# Patient Record
Sex: MALE | Race: NATIVE HAWAIIAN OR OTHER PACIFIC ISLANDER
[De-identification: names, ages, dates, MRNs, and addresses within clinical notes are randomized per-mention and may not be internally consistent; named-entity substitution may affect disease eponyms.]

---

## 2018-06-19 ENCOUNTER — HOSPITAL ENCOUNTER (OUTPATIENT)
Dept: HOSPITAL 47 - EC | Age: 68
Setting detail: OBSERVATION
LOS: 1 days | Discharge: HOME | End: 2018-06-20
Attending: INTERNAL MEDICINE | Admitting: INTERNAL MEDICINE
Payer: MEDICARE

## 2018-06-19 DIAGNOSIS — R07.89: Primary | ICD-10-CM

## 2018-06-19 DIAGNOSIS — Z79.899: ICD-10-CM

## 2018-06-19 DIAGNOSIS — R20.0: ICD-10-CM

## 2018-06-19 DIAGNOSIS — I71.2: ICD-10-CM

## 2018-06-19 DIAGNOSIS — I10: ICD-10-CM

## 2018-06-19 DIAGNOSIS — R20.2: ICD-10-CM

## 2018-06-19 DIAGNOSIS — E78.5: ICD-10-CM

## 2018-06-19 DIAGNOSIS — R79.89: ICD-10-CM

## 2018-06-19 DIAGNOSIS — Z82.5: ICD-10-CM

## 2018-06-19 DIAGNOSIS — Z82.49: ICD-10-CM

## 2018-06-19 DIAGNOSIS — Z87.891: ICD-10-CM

## 2018-06-19 LAB
ALBUMIN SERPL-MCNC: 4.8 G/DL (ref 3.5–5)
ALP SERPL-CCNC: 61 U/L (ref 38–126)
ALT SERPL-CCNC: 31 U/L (ref 21–72)
ANION GAP SERPL CALC-SCNC: 12 MMOL/L
APTT BLD: 22.9 SEC (ref 22–30)
AST SERPL-CCNC: 27 U/L (ref 17–59)
BASOPHILS # BLD AUTO: 0.1 K/UL (ref 0–0.2)
BASOPHILS NFR BLD AUTO: 1 %
BUN SERPL-SCNC: 16 MG/DL (ref 9–20)
CALCIUM SPEC-MCNC: 9.9 MG/DL (ref 8.4–10.2)
CHLORIDE SERPL-SCNC: 106 MMOL/L (ref 98–107)
CK SERPL-CCNC: 101 U/L (ref 55–170)
CK SERPL-CCNC: 91 U/L (ref 55–170)
CO2 SERPL-SCNC: 24 MMOL/L (ref 22–30)
D DIMER PPP FEU-MCNC: 0.63 MG/L FEU (ref ?–0.6)
EOSINOPHIL # BLD AUTO: 0.1 K/UL (ref 0–0.7)
EOSINOPHIL NFR BLD AUTO: 2 %
ERYTHROCYTE [DISTWIDTH] IN BLOOD BY AUTOMATED COUNT: 4.85 M/UL (ref 4.3–5.9)
ERYTHROCYTE [DISTWIDTH] IN BLOOD: 12.3 % (ref 11.5–15.5)
GLUCOSE SERPL-MCNC: 108 MG/DL (ref 74–99)
HCT VFR BLD AUTO: 44.9 % (ref 39–53)
HGB BLD-MCNC: 14.8 GM/DL (ref 13–17.5)
INR PPP: 1 (ref ?–1.2)
LYMPHOCYTES # SPEC AUTO: 1.4 K/UL (ref 1–4.8)
LYMPHOCYTES NFR SPEC AUTO: 18 %
MAGNESIUM SPEC-SCNC: 2 MG/DL (ref 1.6–2.3)
MCH RBC QN AUTO: 30.5 PG (ref 25–35)
MCHC RBC AUTO-ENTMCNC: 32.9 G/DL (ref 31–37)
MCV RBC AUTO: 92.7 FL (ref 80–100)
MONOCYTES # BLD AUTO: 0.4 K/UL (ref 0–1)
MONOCYTES NFR BLD AUTO: 5 %
NEUTROPHILS # BLD AUTO: 5.6 K/UL (ref 1.3–7.7)
NEUTROPHILS NFR BLD AUTO: 73 %
PLATELET # BLD AUTO: 251 K/UL (ref 150–450)
POTASSIUM SERPL-SCNC: 4.4 MMOL/L (ref 3.5–5.1)
PROT SERPL-MCNC: 8.1 G/DL (ref 6.3–8.2)
PT BLD: 10.1 SEC (ref 9–12)
SODIUM SERPL-SCNC: 142 MMOL/L (ref 137–145)
TROPONIN I SERPL-MCNC: <0.012 NG/ML (ref 0–0.03)
TROPONIN I SERPL-MCNC: <0.012 NG/ML (ref 0–0.03)
WBC # BLD AUTO: 7.7 K/UL (ref 3.8–10.6)

## 2018-06-19 PROCEDURE — 82553 CREATINE MB FRACTION: CPT

## 2018-06-19 PROCEDURE — 93351 STRESS TTE COMPLETE: CPT

## 2018-06-19 PROCEDURE — 83735 ASSAY OF MAGNESIUM: CPT

## 2018-06-19 PROCEDURE — 36415 COLL VENOUS BLD VENIPUNCTURE: CPT

## 2018-06-19 PROCEDURE — 85610 PROTHROMBIN TIME: CPT

## 2018-06-19 PROCEDURE — 84484 ASSAY OF TROPONIN QUANT: CPT

## 2018-06-19 PROCEDURE — 93306 TTE W/DOPPLER COMPLETE: CPT

## 2018-06-19 PROCEDURE — 80053 COMPREHEN METABOLIC PANEL: CPT

## 2018-06-19 PROCEDURE — 80061 LIPID PANEL: CPT

## 2018-06-19 PROCEDURE — 85379 FIBRIN DEGRADATION QUANT: CPT

## 2018-06-19 PROCEDURE — 82550 ASSAY OF CK (CPK): CPT

## 2018-06-19 PROCEDURE — 71275 CT ANGIOGRAPHY CHEST: CPT

## 2018-06-19 PROCEDURE — 99285 EMERGENCY DEPT VISIT HI MDM: CPT

## 2018-06-19 PROCEDURE — 71046 X-RAY EXAM CHEST 2 VIEWS: CPT

## 2018-06-19 PROCEDURE — 85025 COMPLETE CBC W/AUTO DIFF WBC: CPT

## 2018-06-19 PROCEDURE — 93005 ELECTROCARDIOGRAM TRACING: CPT

## 2018-06-19 PROCEDURE — 85730 THROMBOPLASTIN TIME PARTIAL: CPT

## 2018-06-19 RX ADMIN — NITROGLYCERIN SCH: 20 OINTMENT TOPICAL at 17:58

## 2018-06-19 RX ADMIN — LOSARTAN POTASSIUM SCH MG: 50 TABLET, FILM COATED ORAL at 20:34

## 2018-06-19 RX ADMIN — METOPROLOL SUCCINATE SCH MG: 25 TABLET, EXTENDED RELEASE ORAL at 18:09

## 2018-06-19 NOTE — P.HPIM
History of Present Illness


67-year-old gentleman with a came in is on and off chest pain about 2-3/10 in 

severity has been going on for sometime last for about few minutes with 

radiation to the left arm as tingling numbness and possible radiation to the 

jaw as well as no associated diaphoresis no associated nausea or shortness of 

breath patient denied any pleuritic chest pain.  CT angios of the chest did not 

show any significant abnormality.  Patient does have history of hypertension 

family history of coronary artery disease denied any premature coronary artery 

disease family history denied any cough runny nose patient is from [place] 

is in his Route to [place].  Patient's pain is on the left side of the chest.  

No other aggravating or relieving factors not associated with food.  EKG did 

not show any acute ST-T wave changes troponin so far negative.








Review of Systems


REVIEW OF SYSTEMS: 


CONSTITUTIONAL: No fever, no malaise, no fatigue. 


HEENT: No recent visual problems or hearing problems. Denied any sore throat. 


CARDIOVASCULAR: No  orthopnea, PND, no palpitations, no syncope. 


PULMONARY: No shortness of breath, no cough, no hemoptysis. 


GASTROINTESTINAL: No diarrhea, no nausea, no vomiting, no abdominal pain. 

Normoactive bowel sounds. 


NEUROLOGICAL: No headaches, no weakness, no numbness. 


HEMATOLOGICAL: Denies any bleeding or petechiae. 


GENITOURINARY: Denies any burning micturition, frequency, or urgency. 


MUSCULOSKELETAL/RHEUMATOLOGICAL: Denies any joint pain, swelling, or any muscle 

pain. 


ENDOCRINE: Denies any polyuria or polydipsia. 





The rest of the 14-point review of systems is negative.











Past Medical History


Past Medical History: Hypertension


History of Any Multi-Drug Resistant Organisms: None Reported


Additional Past Surgical History / Comment(s): rt thumb


Past Psychological History: No Psychological Hx Reported, Depression


Smoking Status: Never smoker


Past Alcohol Use History: Occasional


Past Drug Use History: Marijuana





Medications and Allergies


 Home Medications











 Medication  Instructions  Recorded  Confirmed  Type


 


Ibuprofen [Advil] 200 mg PO Q8HR PRN 06/19/18 06/19/18 History


 


Losartan [Cozaar] 50 mg PO BID 06/19/18 06/19/18 History


 


Metoprolol Succinate (ER) [Toprol 25 mg PO TID 06/19/18 06/19/18 History





Xl]    











 Allergies











Allergy/AdvReac Type Severity Reaction Status Date / Time


 


No Known Allergies Allergy   Verified 06/19/18 12:28














Physical Exam


Vitals: 


 Vital Signs











  Temp Pulse Resp BP Pulse Ox


 


 06/19/18 13:00   72  18  151/98  100


 


 06/19/18 12:33   70  18  147/94  100


 


 06/19/18 11:30  98.9 F  80  18  194/110  98








 Intake and Output











 06/18/18 06/19/18 06/19/18





 22:59 06:59 14:59


 


Other:   


 


  Weight   69.672 kg











PHYSICAL EXAMINATION: 





GENERAL: The patient is alert and oriented x3, not in any acute distress. Well 

developed, well nourished. 


HEENT: Pupils are round and equally reacting to light. EOMI. No scleral 

icterus. No conjunctival pallor. Normocephalic, atraumatic. No pharyngeal 

erythema. No thyromegaly. 


CARDIOVASCULAR: S1 and S2 present. No murmurs, rubs, or gallops. 


PULMONARY: Chest is clear to auscultation, no wheezing or crackles. 


ABDOMEN: Soft, nontender, nondistended, normoactive bowel sounds. No palpable 

organomegaly. 


MUSCULOSKELETAL: No joint swelling or deformity.


EXTREMITIES: No cyanosis, clubbing, or pedal edema. 


NEUROLOGICAL: Gross neurological examination did not reveal any focal deficits. 


SKIN: No rashes. 











Results


CBC & Chem 7: 


 06/19/18 11:44





 06/19/18 11:44


Labs: 


 Abnormal Lab Results - Last 24 Hours (Table)











  06/19/18 06/19/18 Range/Units





  11:44 11:44 


 


D-Dimer   0.63 H  (<0.60)  mg/L FEU


 


Glucose  108 H   (74-99)  mg/dL














Assessment and Plan


Plan: 


Chest pain: Etiology is not clear, we will rule out acute coronary syndromes 2 

more sets of troponins and EKGs patient may need a stress test cardiology will 

evaluate the patient.


-Rule out pulmonary embolism


Hypertension continue his home medications of losartan and metoprolol

## 2018-06-19 NOTE — XR
EXAMINATION TYPE: XR chest 2V

 

DATE OF EXAM: 6/19/2018

 

COMPARISON: NONE

 

TECHNIQUE: PA and lateral views submitted.

 

HISTORY: Chest pain

 

FINDINGS:

The lungs are clear and  there is no pneumothorax, pleural effusion, or focal pneumonia.  Atheroscler
otic change aorta. No overt failure. Chronic appearing rib deformity along the lower right lateral ri
b cage suggest remote trauma. Degenerative change of the spine.

 

IMPRESSION: 

1. No acute process.

## 2018-06-19 NOTE — ED
General Adult HPI





- General


Chief complaint: Chest Pain


Stated complaint: Chest Pain


Time Seen by Provider: 06/19/18 11:36


Source: patient, RN notes reviewed


Mode of arrival: wheelchair


Limitations: no limitations





- History of Present Illness


Initial comments: 





Patient is a pleasant 67-year-old male presenting to the emergency Department 

with complaints of chest discomfort.  Symptoms have been present for the past 3 

days.  Symptoms are intermittent and mild.  Discomfort is described as pressure 

towards the right side of the chest.  There is some tingling of the left arm 

associated.  Otherwise no dyspnea, nausea, or diaphoresis.  Patient did have 

similar symptoms once years ago that he associated to his high blood pressure.





- Related Data


 Home Medications











 Medication  Instructions  Recorded  Confirmed


 


Ibuprofen [Advil] 200 mg PO Q8HR PRN 06/19/18 06/19/18


 


Losartan [Cozaar] 50 mg PO BID 06/19/18 06/19/18


 


Metoprolol Succinate (ER) [Toprol 25 mg PO TID 06/19/18 06/19/18





Xl]   











 Allergies











Allergy/AdvReac Type Severity Reaction Status Date / Time


 


No Known Allergies Allergy   Verified 06/19/18 12:28














Review of Systems


ROS Statement: 


Those systems with pertinent positive or pertinent negative responses have been 

documented in the HPI.





ROS Other: All systems not noted in ROS Statement are negative.


Constitutional: Denies: fever


Eyes: Denies: eye pain


ENT: Denies: ear pain


Respiratory: Denies: cough, dyspnea


Cardiovascular: Reports: chest pain


Endocrine: Denies: fatigue


Gastrointestinal: Denies: abdominal pain


Genitourinary: Denies: dysuria


Musculoskeletal: Denies: back pain


Skin: Denies: rash


Neurological: Denies: weakness





Past Medical History


Past Medical History: Hypertension


History of Any Multi-Drug Resistant Organisms: None Reported


Additional Past Surgical History / Comment(s): rt thumb


Past Psychological History: No Psychological Hx Reported, Depression


Smoking Status: Never smoker


Past Alcohol Use History: Occasional


Past Drug Use History: Marijuana





General Exam


Limitations: no limitations


General appearance: alert, in no apparent distress


Head exam: Present: atraumatic


Eye exam: Present: normal appearance, PERRL


ENT exam: Present: normal oropharynx


Neck exam: Present: normal inspection


Respiratory exam: Present: normal lung sounds bilaterally.  Absent: chest wall 

tenderness


Cardiovascular Exam: Present: regular rate, normal rhythm, normal heart sounds


  ** Expanded


Peripheral pulses: 2+: Radial (R), Radial (L), Dorsalis Pedis (R), Dorsalis 

Pedis (L)


GI/Abdominal exam: Present: soft.  Absent: tenderness


Extremities exam: Present: normal inspection.  Absent: pedal edema, calf 

tenderness


Neurological exam: Present: alert


Psychiatric exam: Present: normal affect, normal mood


Skin exam: Present: normal color





Course


 Vital Signs











  06/19/18 06/19/18 06/19/18





  11:30 12:33 13:00


 


Temperature 98.9 F  


 


Pulse Rate 80 70 72


 


Respiratory 18 18 18





Rate   


 


Blood Pressure 194/110 147/94 151/98


 


O2 Sat by Pulse 98 100 100





Oximetry   














EKG Findings





- EKG Comments:


EKG Findings:: Normal sinus rhythm 66.  .  QRS 92.  .  .  

Left axis.  Normal QRS.  No acute ST change.





Medical Decision Making





- Medical Decision Making





Patient reevaluated and resting comfortably in bed.  Patient and family updated 

on results, specifically regarding aortic aneurysm and need for further 

evaluation and follow-up for this.  Case was discussed with Dr. Mae, who 

will admit for hospital call.





- Lab Data


Result diagrams: 


 06/19/18 11:44





 06/19/18 11:44


 Lab Results











  06/19/18 06/19/18 06/19/18 Range/Units





  11:44 11:44 11:44 


 


WBC   7.7   (3.8-10.6)  k/uL


 


RBC   4.85   (4.30-5.90)  m/uL


 


Hgb   14.8   (13.0-17.5)  gm/dL


 


Hct   44.9   (39.0-53.0)  %


 


MCV   92.7   (80.0-100.0)  fL


 


MCH   30.5   (25.0-35.0)  pg


 


MCHC   32.9   (31.0-37.0)  g/dL


 


RDW   12.3   (11.5-15.5)  %


 


Plt Count   251   (150-450)  k/uL


 


Neutrophils %   73   %


 


Lymphocytes %   18   %


 


Monocytes %   5   %


 


Eosinophils %   2   %


 


Basophils %   1   %


 


Neutrophils #   5.6   (1.3-7.7)  k/uL


 


Lymphocytes #   1.4   (1.0-4.8)  k/uL


 


Monocytes #   0.4   (0-1.0)  k/uL


 


Eosinophils #   0.1   (0-0.7)  k/uL


 


Basophils #   0.1   (0-0.2)  k/uL


 


PT     (9.0-12.0)  sec


 


INR     (<1.2)  


 


APTT     (22.0-30.0)  sec


 


D-Dimer     (<0.60)  mg/L FEU


 


Sodium    142  (137-145)  mmol/L


 


Potassium    4.4  (3.5-5.1)  mmol/L


 


Chloride    106  ()  mmol/L


 


Carbon Dioxide    24  (22-30)  mmol/L


 


Anion Gap    12  mmol/L


 


BUN    16  (9-20)  mg/dL


 


Creatinine    1.13  (0.66-1.25)  mg/dL


 


Est GFR (CKD-EPI)AfAm    78  (>60 ml/min/1.73 sqM)  


 


Est GFR (CKD-EPI)NonAf    67  (>60 ml/min/1.73 sqM)  


 


Glucose    108 H  (74-99)  mg/dL


 


Calcium    9.9  (8.4-10.2)  mg/dL


 


Magnesium    2.0  (1.6-2.3)  mg/dL


 


Total Bilirubin    0.8  (0.2-1.3)  mg/dL


 


AST    27  (17-59)  U/L


 


ALT    31  (21-72)  U/L


 


Alkaline Phosphatase    61  ()  U/L


 


Total Creatine Kinase  101    ()  U/L


 


CK-MB (CK-2)  0.7    (0.0-2.4)  ng/mL


 


CK-MB (CK-2) Rel Index  0.7    


 


Troponin I  <0.012    (0.000-0.034)  ng/mL


 


Total Protein    8.1  (6.3-8.2)  g/dL


 


Albumin    4.8  (3.5-5.0)  g/dL














  06/19/18 Range/Units





  11:44 


 


WBC   (3.8-10.6)  k/uL


 


RBC   (4.30-5.90)  m/uL


 


Hgb   (13.0-17.5)  gm/dL


 


Hct   (39.0-53.0)  %


 


MCV   (80.0-100.0)  fL


 


MCH   (25.0-35.0)  pg


 


MCHC   (31.0-37.0)  g/dL


 


RDW   (11.5-15.5)  %


 


Plt Count   (150-450)  k/uL


 


Neutrophils %   %


 


Lymphocytes %   %


 


Monocytes %   %


 


Eosinophils %   %


 


Basophils %   %


 


Neutrophils #   (1.3-7.7)  k/uL


 


Lymphocytes #   (1.0-4.8)  k/uL


 


Monocytes #   (0-1.0)  k/uL


 


Eosinophils #   (0-0.7)  k/uL


 


Basophils #   (0-0.2)  k/uL


 


PT  10.1  (9.0-12.0)  sec


 


INR  1.0  (<1.2)  


 


APTT  22.9  (22.0-30.0)  sec


 


D-Dimer  0.63 H  (<0.60)  mg/L FEU


 


Sodium   (137-145)  mmol/L


 


Potassium   (3.5-5.1)  mmol/L


 


Chloride   ()  mmol/L


 


Carbon Dioxide   (22-30)  mmol/L


 


Anion Gap   mmol/L


 


BUN   (9-20)  mg/dL


 


Creatinine   (0.66-1.25)  mg/dL


 


Est GFR (CKD-EPI)AfAm   (>60 ml/min/1.73 sqM)  


 


Est GFR (CKD-EPI)NonAf   (>60 ml/min/1.73 sqM)  


 


Glucose   (74-99)  mg/dL


 


Calcium   (8.4-10.2)  mg/dL


 


Magnesium   (1.6-2.3)  mg/dL


 


Total Bilirubin   (0.2-1.3)  mg/dL


 


AST   (17-59)  U/L


 


ALT   (21-72)  U/L


 


Alkaline Phosphatase   ()  U/L


 


Total Creatine Kinase   ()  U/L


 


CK-MB (CK-2)   (0.0-2.4)  ng/mL


 


CK-MB (CK-2) Rel Index   


 


Troponin I   (0.000-0.034)  ng/mL


 


Total Protein   (6.3-8.2)  g/dL


 


Albumin   (3.5-5.0)  g/dL














- Radiology Data


Radiology results: report reviewed (CT angios chest shows no evidence of 

pulmonary embolism.  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 

image reviewed (Chest x-ray shows no acute process)





Disposition


Clinical Impression: 


 Chest pain





Disposition: ADMITTED AS IP TO THIS [facility]


Is patient prescribed a controlled substance at d/c from ED?: No


Referrals: 


None,Stated [REFERRING] - 1-2 days


Decision Time: 14:11

## 2018-06-20 VITALS — SYSTOLIC BLOOD PRESSURE: 166 MMHG | HEART RATE: 88 BPM | TEMPERATURE: 98.3 F | DIASTOLIC BLOOD PRESSURE: 97 MMHG

## 2018-06-20 VITALS — RESPIRATION RATE: 18 BRPM

## 2018-06-20 LAB
CHOLEST SERPL-MCNC: 202 MG/DL (ref ?–200)
CK SERPL-CCNC: 79 U/L (ref 55–170)
HDLC SERPL-MCNC: 36 MG/DL (ref 40–60)
LDLC SERPL CALC-MCNC: 117 MG/DL (ref 0–99)
TRIGL SERPL-MCNC: 243 MG/DL (ref ?–150)
TROPONIN I SERPL-MCNC: <0.012 NG/ML (ref 0–0.03)

## 2018-06-20 RX ADMIN — LOSARTAN POTASSIUM SCH MG: 50 TABLET, FILM COATED ORAL at 12:14

## 2018-06-20 RX ADMIN — NITROGLYCERIN SCH: 20 OINTMENT TOPICAL at 05:00

## 2018-06-20 RX ADMIN — NITROGLYCERIN SCH: 20 OINTMENT TOPICAL at 11:39

## 2018-06-20 RX ADMIN — METOPROLOL SUCCINATE SCH: 25 TABLET, EXTENDED RELEASE ORAL at 05:00

## 2018-06-20 NOTE — P.STRESS
- Stress Test Note


Stress Test Results/Findings: 


Exam Performed:  stress echo exercise


Exam Date: 06/20/18


Reason for Exam: CHEST PAIN





Height: 5 ft 4 in


Weight: 69.4 kg


Protocol: NICHOLAS


Stage: 3


Duration of Exercise: 8:00





Resting Heart Rate: 93


Resting Blood Pressure: 170/91


Maximum Achieved Heart Rate: 146


Maximum Achieved Blood Pressure: 203/101


85% PMHR: 130


100% PMHR: 153


METS: 9.7


Technologist Comment: 





Stress Test Results/Findings: 


This is a 67-year-old gentleman with history of hypertension, 

hypercholesterolemia and family history being evaluated for chest pains.





Stress data: Baseline EKG showed sinus rhythm with normal TN interval and QRS 

duration.  Blood pressure at rest is 170/90 with pulse rate of 93.  Patient 

walked on the Nicholas protocol for 8 minutes achieving a maximal heart rate of 

146 with a blood pressure 165/88.  EKGs taken during and after exercise did not 

reveal any changes to suggest ischemia.





Echo data: Baseline echo images show normal wall motion and thickening.  

Exercise echo images showed augmentation of wall motion and thickening in all 

the segments.





Final impression: #1.  Negative stress test #2.  Average exercise capacity #3.  

Negative stress echo.  #4.  Patient did not experience any chest pain.

## 2018-06-20 NOTE — ECHOF
Referral Reason:cp



MEASUREMENTS

--------

HEIGHT: 162.6 cm

WEIGHT: 69.4 kg

BP: 124/81

RVIDd:   2.6 cm     (< 3.3)

IVSd:   1.2 cm     (0.6 - 1.1)

LVIDd:   4.3 cm     (3.9 - 5.3)

LVPWd:   0.9 cm     (0.6 - 1.1)

IVSs:   1.6 cm

LVIDs:   2.6 cm

LVPWs:   1.7 cm

LA Diam:   3.3 cm     (2.7 - 3.8)

LAESV Index (A-L):   34.14 ml/m

Ao Diam:   3.6 cm     (2.0 - 3.7)

AV Cusp:   1.9 cm     (1.5 - 2.6)

LA Diam:   3.7 cm     (2.7 - 3.8)

MV EXCURSION:   14.230 mm     (> 18.000)

MV EF SLOPE:   27 mm/s     (70 - 150)

EPSS:   0.4 cm

MV E Ab:   0.46 m/s

MV DecT:   190 ms

MV A Ab:   0.74 m/s

MV E/A Ratio:   0.62 

AR PHT:   616 ms

RAP:   5.00 mmHg

RVSP:   26.66 mmHg







FINDINGS

--------

Sinus rhythm.

This was a technically good study.

The left ventricular size is normal.   There is mild concentric left ventricular hypertrophy.   Overa
ll left ventricular systolic function is low-normal with, an EF between 50 - 55 %.

The right ventricle is normal in size.

The left atrial size is normal.    Normal LA  size by volume 22+/-6 ml/m2.

The right atrial size is normal.

There is mild aortic regurgitation.

Mild mitral annular calcification present.   Mild mitral regurgitation is present.

Mild tricuspid regurgitation present.   There is no evidence of pulmonary hypertension.   The right v
entricular systolic pressure, as measured by Doppler, is 26.66mmHg.

There is no pulmonic regurgitation present.

The aortic root size is normal.   Ascending Aortic is dilated and measures 4.0cm.

There is no pericardial effusion.



CONCLUSIONS

--------

1. The left ventricular size is normal.

2. There is mild concentric left ventricular hypertrophy.

3. Overall left ventricular systolic function is low-normal with, an EF between 50 - 55 %.

4. The right ventricle is normal in size.

5. The left atrial size is normal.

6. There is mild aortic regurgitation.

7. Mild mitral annular calcification present.

8. Mild mitral regurgitation is present.

9. Mild tricuspid regurgitation present.

10. There is no evidence of pulmonary hypertension.

11. The right ventricular systolic pressure, as measured by Doppler, is 26.66mmHg.

12. There is no pulmonic regurgitation present.

13. The aortic root size is normal.

14. Ascending Aortic is dilated and measures 4.0cm.

15. There is no pericardial effusion.





SONOGRAPHER: Milena Rooney RDCS

## 2018-06-20 NOTE — P.CRDCN
History of Present Illness


History of present illness: 





Mr. Celaya is a pleasant 67-year-old male past medical history significant 

for hypertension. He denies history of coronary artery disease, diabetes 

mellitus or aortic abnormality. He states he has been told in the past he has 

dyslipidemia but is not on medications. He is here on vacation from [place]. 

While he was driving he felt a a heavy pressure sensation in the right anterior 

chest wall with some left hand tingling. The symptoms have been intermittent 

for the previous 3 days with no specific aggravating or alleviating factors. He 

denies associated shortness of breath, palpitations, nausea, vomiting or 

diaphoresis. No radiation to the back, neck or jaw. The pain when it comes has 

remained localized to the right anterior chest wall, non-reproducible on 

palpitation. CTA of the chest was performed secondary to elevated d-dimer and 

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  He denies 

ever being told this in the past.  He has had no further symptoms of chest 

discomfort since admission to the hospital.  Telemetry tracings have been 

unremarkable.  Documented medication is Toprol 25 mg 3 times a day.  Discussion 

was had with the patient regarding his dosage of this and he states he does 

take it 3 times a day although he is out of his prescription he has no bottles 

for confirmation.  I suspect this is Toprol tartrate not succinate.  Blood 

pressure on arrival was 194/110 with a heart rate of 80.





EKG reveals sinus mechanism with no acute ST or T-wave abnormalities. 


Chest xray is negative for an acute cardiopulmonary process. 


Laboratory data reviewed, hemoglobin 14.8, platelets 251, d-dimer 0.63, sodium 

142, potassium 4.4, creatinine 1.13, magnesium 2.0, cardiac enzymes negative 3

, , HDL 36, triglycerides 243 interval cholesterol 202.


Current cardiac medications include Lopressor 25 mg 3 times a day and losartan 

50 mg twice a day.





Review of Systems





At the time of my exam:


CONSTITUTIONAL: Denies fever. Denies chills.


EYES: Denies blurred vision. Denies vision changes. Denies eye pain.


EARS, NOSE, MOUTH & THROAT: Denies headache. Denies sore throat. Denies ear 

pain.


CARDIOVASCULAR: Denies chest pain. Denies shortness of breath. Denies 

orthopnea. Denies PND. Denies palpitations.


RESPIRATORY: Denies cough. 


GASTROINTESTINAL: Denies abdominal pain. Denies diarrhea. Denies constipation. 

Denies nausea. Denies vomiting.


MUSCULOSKELETAL: Denies myalgias.


INTEGUMENTARY: Denies pruitis. Denies rash.


NEUROLOGIC: Denies numbness. Denies tingling. Denies weakness.


PSYCHIATRIC: Denies anxiety. Denies depression.


ENDOCRINE: Denies fatigue. Denies weight change. Denies polydipsia. Denies 

polyurina.


GENITOURINARY: Denies burning, hematuria or urgency with micturation.


HEMATOLOGIC: Denies history of anemia. Denies bleeding. 








Past Medical History


Past Medical History: Hypertension


Additional Past Medical History / Comment(s): upper bridge


History of Any Multi-Drug Resistant Organisms: None Reported


Additional Past Surgical History / Comment(s): rt thumb recontruction sx has 

pins


Past Anesthesia/Blood Transfusion Reactions: No Reported Reaction


Additional Past Anesthesia/Blood Transfusion Reaction / Comment(s): 

clausterphobia


Smoking Status: Former smoker





- Past Family History


  ** Father


Family Medical History: COPD


Additional Family Medical History / Comment(s):  at age 82





  ** Mother


Family Medical History: Hypertension


Additional Family Medical History / Comment(s): alive-is age 89





Medications and Allergies


 Home Medications











 Medication  Instructions  Recorded  Confirmed  Type


 


Ibuprofen [Advil] 200 mg PO Q8HR PRN 18 History


 


Losartan [Cozaar] 50 mg PO BID 18 History


 


Metoprolol Succinate (ER) [Toprol 25 mg PO TID 18 History





Xl]    











 Allergies











Allergy/AdvReac Type Severity Reaction Status Date / Time


 


No Known Allergies Allergy   Verified 18 12:28














Physical Exam


Vitals: 


 Vital Signs











  Temp Pulse Pulse Resp BP BP Pulse Ox


 


 18 08:00    79  18   


 


 18 07:50  97.8 F   79  18   124/81  99


 


 18 04:00  97.6 F   59 L  16   151/85  100


 


 18 00:00  98.6 F   79  16   130/85  97


 


 18 20:00     16   


 


 18 18:36  98.7 F   99  16   137/86  96


 


 18 17:44  97.9 F  99   16  115/78   97


 


 18 14:46   82   18  134/88   99


 


 18 13:00   72   18  151/98   100


 


 18 12:33   70   18  147/94   100


 


 18 11:30  98.9 F  80   18  194/110   98








 Intake and Output











 18





 22:59 06:59 14:59


 


Intake Total 200  


 


Balance 200  


 


Intake:   


 


  Oral 200  


 


Other:   


 


  Voiding Method Toilet Toilet Toilet


 


  # Voids  1 














Blood pressure 124/81 heart rate 79 afebrile maintaining oxygen saturation on 

room air.


GENERAL: This is a 67-year-old male in no apparent distress at the time of my 

examination.


HEENT: Head is atraumatic, normocephalic. Pupils are equal, round. Sclerae 

anicteric. Conjunctivae are clear. Mucous membranes of the mouth are moist. 

Neck is supple. There is no jugular venous distention. No carotid bruit is 

heard.


LUNGS: Clear to auscultation no wheezes, rales or rhonchi. No chest wall 

tenderness is noted on palpation or with deep breathing.


HEART: Regular rate and rhythm without murmurs, rubs or gallops. S1 and S2 

heard.


ABDOMEN: Soft, nontender. Bowel sounds are heard. No organomegaly noted.


EXTREMITIES: No evidence of peripheral edema and no calf tenderness noted.


VASCULAR: Radial and dorsalis pedis pulses palpated, no evidence of clubbing.  


NEUROLOGIC: Patient is awake, alert and oriented x3.


 








Results





 18 11:44





 18 11:44


 Cardiac Enzymes











  18 Range/Units





  11:44 11:44 18:23 


 


AST   27   (17-59)  U/L


 


CK-MB (CK-2)  0.7   0.6  (0.0-2.4)  ng/mL


 


Troponin I  <0.012   <0.012  (0.000-0.034)  ng/mL














  18 Range/Units





  23:11 


 


AST   (17-59)  U/L


 


CK-MB (CK-2)  0.5  (0.0-2.4)  ng/mL


 


Troponin I  <0.012  (0.000-0.034)  ng/mL








 Coagulation











  18 Range/Units





  11:44 


 


PT  10.1  (9.0-12.0)  sec


 


APTT  22.9  (22.0-30.0)  sec








 Lipids











  18 Range/Units





  23:11 


 


Triglycerides  243 H  (<150)  mg/dL


 


Cholesterol  202 H  (<200)  mg/dL


 


HDL Cholesterol  36 L  (40-60)  mg/dL








 CBC











  18 Range/Units





  11:44 


 


WBC  7.7  (3.8-10.6)  k/uL


 


RBC  4.85  (4.30-5.90)  m/uL


 


Hgb  14.8  (13.0-17.5)  gm/dL


 


Hct  44.9  (39.0-53.0)  %


 


Plt Count  251  (150-450)  k/uL








 Comprehensive Metabolic Panel











  18 Range/Units





  11:44 


 


Sodium  142  (137-145)  mmol/L


 


Potassium  4.4  (3.5-5.1)  mmol/L


 


Chloride  106  ()  mmol/L


 


Carbon Dioxide  24  (22-30)  mmol/L


 


BUN  16  (9-20)  mg/dL


 


Creatinine  1.13  (0.66-1.25)  mg/dL


 


Glucose  108 H  (74-99)  mg/dL


 


Calcium  9.9  (8.4-10.2)  mg/dL


 


AST  27  (17-59)  U/L


 


ALT  31  (21-72)  U/L


 


Alkaline Phosphatase  61  ()  U/L


 


Total Protein  8.1  (6.3-8.2)  g/dL


 


Albumin  4.8  (3.5-5.0)  g/dL








 Current Medications











Generic Name Dose Route Start Last Admin





  Trade Name Freq  PRN Reason Stop Dose Admin


 


Aspirin  81 mg  18 09:00  





  Aspirin  PO   





  DAILY [facility]   


 


Losartan Potassium  50 mg  18 21:00  18 20:34





  Cozaar  PO   50 mg





  BID [facility]   Administration


 


Nitroglycerin  0.4 mg  18 14:12  





  Nitrostat  SUBLINGUAL   





  Q5M PRN   





  Chest Pain   








 Intake and Output











 18





 22:59 06:59 14:59


 


Intake Total 200  


 


Balance 200  


 


Intake:   


 


  Oral 200  


 


Other:   


 


  Voiding Method Toilet Toilet Toilet


 


  # Voids  1 








 





 18 11:44 





 18 11:44 











Assessment and Plan


Assessment: 





ASSESSMENT


1. Chest pain, atypical. An acute coronary event has been ruled out with no EKG 

evidence of ischemia and negative cardiac enzymes. 


2. Hypertension


3. Ascending thoarcic aortic aneurysm, 4.1 cm. 


4. Dyslipidemia





PLAN


Obtain 2D echocardiogram and doppler study to assess cardiac structure and 

function. 


Perform stress echocardiogram to assess for stress induced ischemia. 


Recommend aspirin 81 mg daily not 325.  Resume losartan 50 mg twice a day and 

adjust metoprolol to Lopressor 50 mg twice a day.


Initiate on medium intensity statin, atorvastatin 40 mg daily. Liver function 

is normal. Follow up liver function tests in 3-4 weeks with his PCP. 


Lifestyle modifications recommended. 


He has been advised to follow up with his physician when he returns home to 

[place] for his aortic aneurysm. As well as the symptoms to be aware of and 

the importance of blood pressure control. 


Thank you kindly for this consultation. 





Nurse Practitioner note has been reviewed, I agree with a documented findings 

and plan of care.  Patient was seen and examined.

## 2018-06-20 NOTE — P.DS
Providers


Date of admission: 


06/19/18 14:12





Attending physician: 


Ean Mae





Consults: 





 





06/19/18 14:12


Consult Physician Urgent 


   Consulting Provider: Jesi Cardenas


   Consult Reason/Comments: cp


   Do you want consulting provider notified?: Yes











Primary care physician: 


Physician Nonstaff





Hospital Course: 


67-year-old pleasant gentleman was admitted for chest pain we'll ruling out a 

concurrent syndromes patient CT of the chest is negative for pulmonary 

embolism.  Patient will undergo stress test and echocardiogram if they're 

negative patient will be discharged today.





PHYSICAL EXAMINATION: 





GENERAL: The patient is alert and oriented x3, not in any acute distress. Well 

developed, well nourished. 


HEENT: Pupils are round and equally reacting to light. EOMI. No scleral 

icterus. No conjunctival pallor. Normocephalic, atraumatic. No pharyngeal 

erythema. No thyromegaly. 


CARDIOVASCULAR: S1 and S2 present. No murmurs, rubs, or gallops. 


PULMONARY: Chest is clear to auscultation, no wheezing or crackles. 


ABDOMEN: Soft, nontender, nondistended, normoactive bowel sounds. No palpable 

organomegaly. 


MUSCULOSKELETAL: No joint swelling or deformity.


EXTREMITIES: No cyanosis, clubbing, or pedal edema. 


NEUROLOGICAL: Gross neurological examination did not reveal any focal deficits. 


SKIN: No rashes. 











Plan - Discharge Summary


Discharge Rx Participant: Yes


New Discharge Prescriptions: 


No Action


   Metoprolol Succinate (ER) [Toprol Xl] 25 mg PO TID


   Losartan [Cozaar] 50 mg PO BID


   Ibuprofen [Advil] 200 mg PO Q8HR PRN


     PRN Reason: Migraine Headache


Discharge Medication List





Ibuprofen [Advil] 200 mg PO Q8HR PRN 06/19/18 [History]


Losartan [Cozaar] 50 mg PO BID 06/19/18 [History]


Metoprolol Succinate (ER) [Toprol Xl] 25 mg PO TID 06/19/18 [History]








Follow up Appointment(s)/Referral(s): 


None,Stated [REFERRING] - 1-2 days

## 2018-06-25 NOTE — ECHOS
Stress Test Results/Findings: 

Exam Performed:  stress echo exercise

Exam Date: 06/20/18

Reason for Exam: CHEST PAIN



Height: 5 ft 4 in

Weight: 69.4 kg

Protocol: NICHOLAS

Stage: 3

Duration of Exercise: 8:00



Resting Heart Rate: 93

Resting Blood Pressure: 170/91

Maximum Achieved Heart Rate: 146

Maximum Achieved Blood Pressure: 203/101

85% PMHR: 130

100% PMHR: 153

METS: 9.7

Technologist Comment: 



Stress Test Results/Findings: 

This is a 67-year-old gentleman with history of hypertension, 
hypercholesterolemia and family history being evaluated for chest pains.



Stress data: Baseline EKG showed sinus rhythm with normal DE interval and QRS 
duration.  Blood pressure at rest is 170/90 with pulse rate of 93.  Patient 
walked on the Nicholas protocol for 8 minutes achieving a maximal heart rate of 
146 with a blood pressure 165/88.  EKGs taken during and after exercise did not 
reveal any changes to suggest ischemia.



Echo data: Baseline echo images show normal wall motion and thickening.  
Exercise echo images showed augmentation of wall motion and thickening in all 
the segments.



Final impression: #1.  Negative stress test #2.  Average exercise capacity #3.  
Negative stress echo.  #4.  Patient did not experience any chest pain.

[facility]